# Patient Record
Sex: FEMALE | Race: WHITE | NOT HISPANIC OR LATINO | Employment: FULL TIME | URBAN - METROPOLITAN AREA
[De-identification: names, ages, dates, MRNs, and addresses within clinical notes are randomized per-mention and may not be internally consistent; named-entity substitution may affect disease eponyms.]

---

## 2019-01-23 ENCOUNTER — OFFICE VISIT (OUTPATIENT)
Dept: NEUROLOGY | Facility: CLINIC | Age: 49
End: 2019-01-23

## 2019-01-23 VITALS
SYSTOLIC BLOOD PRESSURE: 122 MMHG | WEIGHT: 177 LBS | HEART RATE: 83 BPM | BODY MASS INDEX: 27.78 KG/M2 | HEIGHT: 67 IN | DIASTOLIC BLOOD PRESSURE: 84 MMHG | RESPIRATION RATE: 20 BRPM | OXYGEN SATURATION: 98 %

## 2019-01-23 DIAGNOSIS — G43.C0 PERIODIC HEADACHE SYNDROME, NOT INTRACTABLE: Primary | ICD-10-CM

## 2019-01-23 DIAGNOSIS — G50.1 ATYPICAL FACIAL PAIN: ICD-10-CM

## 2019-01-23 PROCEDURE — 99244 OFF/OP CNSLTJ NEW/EST MOD 40: CPT | Performed by: PSYCHIATRY & NEUROLOGY

## 2019-01-23 RX ORDER — GABAPENTIN 300 MG/1
300 CAPSULE ORAL DAILY
COMMUNITY
Start: 2018-12-17 | End: 2019-02-18 | Stop reason: SDUPTHER

## 2019-01-23 RX ORDER — CITALOPRAM 40 MG/1
40 TABLET ORAL DAILY
COMMUNITY
Start: 2019-01-04 | End: 2022-08-08

## 2019-01-23 NOTE — PROGRESS NOTES
Subjective   Patient ID: Amparo Roman is a 48 y.o. female     Chief Complaint   Patient presents with   • Facial Pain        History of Present Illness    48 y.o. female referred by DR Obi Bar for atypical facial apin.  Last two years developed head tremor.  If laying head back feels desire to move head side to side and has to suppress movements.  Over the years has had motor ticks of eyes and mouth.      One week prior to [facility] right side of face abrupt onset severe pain.  Felt like a drill into her right side face.  Taken to ED and given steroid and pain medication.  Spell lasted for 30 min then stopped  Head hurt in forehead.    Then mild episodes of severe pain.  Restless with pain in head.  Sx waxed and waned over the week.      Started on GBP 3 weeks and sx have lessened.  Rare occasional burning in right face.         HA are once a week.  Severe intensity.  Assoc sx of N/V.  Vomiting improves sx.  Qualitys is throbbing.  Lasts for hours.        Reviewed medical records:    December 2018 developed throbbing and burningpain on right side of face.  Treated with steroid shots and pain medications.  Some improvement then recurred.  Rx GBP.     Past Medical History:   Diagnosis Date   • Depression    • Neuralgia      History reviewed. No pertinent family history.  Social History     Socioeconomic History   • Marital status:      Spouse name: Not on file   • Number of children: Not on file   • Years of education: Not on file   • Highest education level: Not on file   Tobacco Use   • Smoking status: Current Some Day Smoker   • Smokeless tobacco: Never Used   Substance and Sexual Activity   • Alcohol use: No     Frequency: Never   • Drug use: No       Review of Systems   Constitutional: Negative for activity change, fatigue and unexpected weight change.   HENT: Negative for tinnitus and trouble swallowing.    Eyes: Positive for visual disturbance. Negative for photophobia.   Respiratory: Negative  "for apnea, cough and choking.    Cardiovascular: Negative for leg swelling.   Gastrointestinal: Negative for nausea and vomiting.   Endocrine: Negative for cold intolerance and heat intolerance.   Genitourinary: Negative for difficulty urinating, frequency, menstrual problem and urgency.   Musculoskeletal: Negative for back pain, gait problem, myalgias and neck pain.   Skin: Negative for color change and rash.   Allergic/Immunologic: Negative for immunocompromised state.   Neurological: Positive for dizziness, numbness and headaches. Negative for tremors, seizures, syncope, facial asymmetry, speech difficulty, weakness and light-headedness.   Hematological: Negative for adenopathy. Does not bruise/bleed easily.   Psychiatric/Behavioral: Negative for behavioral problems, confusion, decreased concentration, hallucinations and sleep disturbance.       Objective     Vitals:    01/23/19 0921   BP: 122/84   Pulse: 83   Resp: 20   SpO2: 98%   Weight: 80.3 kg (177 lb)   Height: 170.2 cm (67\")       Neurologic Exam     Mental Status   Oriented to person, place, and time.   Registration: recalls 3 of 3 objects. Recall at 5 minutes: recalls 3 of 3 objects. Follows 3 step commands.   Attention: normal. Concentration: normal.   Speech: speech is normal   Level of consciousness: alert  Knowledge: good and consistent with education.   Able to name object. Able to read. Able to repeat. Able to write. Normal comprehension.     Cranial Nerves     CN II   Visual fields full to confrontation.   Visual acuity: normal  Right visual field deficit: none  Left visual field deficit: none     CN III, IV, VI   Pupils are equal, round, and reactive to light.  Extraocular motions are normal.   Right pupil: Shape: regular. Reactivity: brisk. Consensual response: intact.   Left pupil: Shape: regular. Reactivity: brisk. Consensual response: intact.   Nystagmus: none   Diplopia: none  Ophthalmoparesis: none  Upgaze: normal  Downgaze: " normal  Conjugate gaze: present  Vestibulo-ocular reflex: present    CN V   Facial sensation intact.   Right corneal reflex: normal  Left corneal reflex: normal    CN VII   Right facial weakness: none  Left facial weakness: none    CN VIII   Hearing: intact    CN IX, X   Palate: symmetric  Right gag reflex: normal  Left gag reflex: normal    CN XI   Right sternocleidomastoid strength: normal  Left sternocleidomastoid strength: normal    CN XII   Tongue: not atrophic  Fasciculations: absent  Tongue deviation: none    Motor Exam   Muscle bulk: normal  Overall muscle tone: normal  Right arm tone: normal  Left arm tone: normal  Right leg tone: normal  Left leg tone: normal    Strength   Strength 5/5 throughout.     Sensory Exam   Light touch normal.   Vibration normal.   Proprioception normal.   Pinprick normal.     Gait, Coordination, and Reflexes     Gait  Gait: normal    Coordination   Romberg: negative  Finger to nose coordination: normal  Heel to shin coordination: normal  Tandem walking coordination: normal    Tremor   Resting tremor: absent  Intention tremor: absent  Action tremor: absent    Reflexes   Reflexes 2+ except as noted.       Physical Exam   Constitutional: She is oriented to person, place, and time. Vital signs are normal. She appears well-developed and well-nourished.   HENT:   Head: Normocephalic and atraumatic.   Eyes: EOM and lids are normal. Pupils are equal, round, and reactive to light.   Fundoscopic exam:       The right eye shows no exudate, no hemorrhage and no papilledema. The right eye shows venous pulsations.        The left eye shows no exudate, no hemorrhage and no papilledema. The left eye shows venous pulsations.   Neck: Normal range of motion and phonation normal. Normal carotid pulses present. Carotid bruit is not present. No thyroid mass and no thyromegaly present.   Cardiovascular: Normal rate, regular rhythm and normal heart sounds.   Pulmonary/Chest: Effort normal.    Neurological: She is oriented to person, place, and time. She has normal strength. She has a normal Finger-Nose-Finger Test, a normal Heel to Shin Test, a normal Romberg Test and a normal Tandem Gait Test. Gait normal.   Skin: Skin is warm and dry.   Psychiatric: She has a normal mood and affect. Her speech is normal.   Nursing note and vitals reviewed.      No results found for any previous visit.         Assessment/Plan     Problem List Items Addressed This Visit        Cardiovascular and Mediastinum    Periodic headache syndrome, not intractable - Primary    Current Assessment & Plan     Headaches are newly identified.  Continue current treatment regimen.             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

## 2019-02-18 ENCOUNTER — OFFICE VISIT (OUTPATIENT)
Dept: NEUROLOGY | Facility: CLINIC | Age: 49
End: 2019-02-18

## 2019-02-18 ENCOUNTER — HOSPITAL ENCOUNTER (OUTPATIENT)
Dept: MRI IMAGING | Facility: HOSPITAL | Age: 49
Discharge: HOME | End: 2019-02-18
Attending: PSYCHIATRY & NEUROLOGY | Admitting: PSYCHIATRY & NEUROLOGY

## 2019-02-18 VITALS
SYSTOLIC BLOOD PRESSURE: 118 MMHG | WEIGHT: 175 LBS | HEIGHT: 67 IN | RESPIRATION RATE: 16 BRPM | BODY MASS INDEX: 27.47 KG/M2 | DIASTOLIC BLOOD PRESSURE: 86 MMHG | HEART RATE: 86 BPM

## 2019-02-18 DIAGNOSIS — G50.1 ATYPICAL FACIAL PAIN: ICD-10-CM

## 2019-02-18 DIAGNOSIS — G50.1 ATYPICAL FACIAL PAIN: Primary | ICD-10-CM

## 2019-02-18 DIAGNOSIS — F95.9 TIC DISORDER: ICD-10-CM

## 2019-02-18 PROCEDURE — 99214 OFFICE O/P EST MOD 30 MIN: CPT | Performed by: PSYCHIATRY & NEUROLOGY

## 2019-02-18 PROCEDURE — A9577 INJ MULTIHANCE: HCPCS | Performed by: PSYCHIATRY & NEUROLOGY

## 2019-02-18 PROCEDURE — 70553 MRI BRAIN STEM W/O & W/DYE: CPT

## 2019-02-18 PROCEDURE — 0 GADOBENATE DIMEGLUMINE 529 MG/ML SOLUTION: Performed by: PSYCHIATRY & NEUROLOGY

## 2019-02-18 RX ORDER — GABAPENTIN 300 MG/1
300 CAPSULE ORAL DAILY
Qty: 30 CAPSULE | Refills: 5 | Status: SHIPPED | OUTPATIENT
Start: 2019-02-18 | End: 2022-08-08

## 2019-02-18 RX ORDER — GUANFACINE 1 MG/1
1 TABLET ORAL NIGHTLY
Qty: 30 TABLET | Refills: 3 | Status: SHIPPED | OUTPATIENT
Start: 2019-02-18 | End: 2022-08-08

## 2019-02-18 RX ADMIN — GADOBENATE DIMEGLUMINE 15 ML: 529 INJECTION, SOLUTION INTRAVENOUS at 11:06

## 2019-02-18 NOTE — PROGRESS NOTES
Subjective   Patient ID: Amparo Roman is a 48 y.o. female     Chief Complaint   Patient presents with   • Headache        History of Present Illness    48 y.o. female returns in follow up for atypical facial pain.  Last visit on 1/23/19 ordered MRI Brain and continued GBP.    MRI Brain, my review of films, left superior cerebellar artery noted to have somewhat asymmetric appearance on the left which may have compressive findings of the adjacent trigeminal nerve as this is laterally adjacent along the margin. Agree with interpretation      Taking  mg qhs and no further episodes of right sided facial pain.       Motor tics of head movements.  Can suppress but then has rebound in sx.  Present for last two years..     Problem history:    Last two years developed head tremor.  If laying head back feels desire to move head side to side and has to suppress movements.  Over the years has had motor ticks of eyes and mouth.      One week prior to [place] right side of face abrupt onset severe pain.  Felt like a drill into her right side face.  Taken to ED and given steroid and pain medication.  Spell lasted for 30 min then stopped  Head hurt in forehead.    Then mild episodes of severe pain.  Restless with pain in head.  Sx waxed and waned over the week.      Started on GBP 3 weeks and sx have lessened.  Rare occasional burning in right face.         HA are once a week.  Severe intensity.  Assoc sx of N/V.  Vomiting improves sx.  Qualitys is throbbing.  Lasts for hours.        Reviewed medical records:    December 2018 developed throbbing and burningpain on right side of face.  Treated with steroid shots and pain medications.  Some improvement then recurred.  Rx GBP.     Past Medical History:   Diagnosis Date   • Depression    • Migraine    • Neuralgia      History reviewed. No pertinent family history.  Social History     Socioeconomic History   • Marital status:      Spouse name: Not on file   • Number of  "children: Not on file   • Years of education: Not on file   • Highest education level: Not on file   Tobacco Use   • Smoking status: Current Some Day Smoker   • Smokeless tobacco: Never Used   Substance and Sexual Activity   • Alcohol use: No     Frequency: Never   • Drug use: No   • Sexual activity: Defer       Review of Systems   Constitutional: Negative for activity change, fatigue and unexpected weight change.   HENT: Negative for trouble swallowing.    Eyes: Positive for visual disturbance.   Respiratory: Negative for apnea and choking.    Cardiovascular: Negative for leg swelling.   Endocrine: Negative for cold intolerance and heat intolerance.   Genitourinary: Negative for difficulty urinating, frequency, menstrual problem and urgency.   Musculoskeletal: Negative for gait problem and myalgias.   Skin: Negative for color change and rash.   Allergic/Immunologic: Negative for immunocompromised state.   Neurological: Negative for tremors, syncope, facial asymmetry, speech difficulty and light-headedness.   Hematological: Negative for adenopathy. Does not bruise/bleed easily.   Psychiatric/Behavioral: Negative for behavioral problems, confusion, decreased concentration, hallucinations and sleep disturbance.       Objective     Vitals:    02/18/19 1300   BP: 118/86   BP Location: Left arm   Patient Position: Sitting   Cuff Size: Adult   Pulse: 86   Resp: 16   Weight: 79.4 kg (175 lb)   Height: 170.2 cm (67\")       Neurologic Exam     Mental Status   Registration: recalls 3 of 3 objects. Recall at 5 minutes: recalls 3 of 3 objects. Follows 3 step commands.   Attention: normal. Concentration: normal.   Level of consciousness: alert  Knowledge: good and consistent with education.   Able to name object. Able to read. Able to repeat. Able to write. Normal comprehension.     Cranial Nerves     CN II   Visual fields full to confrontation.   Visual acuity: normal  Right visual field deficit: none  Left visual field " deficit: none     CN III, IV, VI   Right pupil: Shape: regular. Reactivity: brisk. Consensual response: intact.   Left pupil: Shape: regular. Reactivity: brisk. Consensual response: intact.   Nystagmus: none   Diplopia: none  Ophthalmoparesis: none  Upgaze: normal  Downgaze: normal  Conjugate gaze: present  Vestibulo-ocular reflex: present    CN V   Facial sensation intact.   Right corneal reflex: normal  Left corneal reflex: normal    CN VII   Right facial weakness: none  Left facial weakness: none    CN VIII   Hearing: intact    CN IX, X   Palate: symmetric  Right gag reflex: normal  Left gag reflex: normal    CN XI   Right sternocleidomastoid strength: normal  Left sternocleidomastoid strength: normal    CN XII   Tongue: not atrophic  Fasciculations: absent  Tongue deviation: none    Motor Exam   Muscle bulk: normal  Overall muscle tone: normal  Right arm tone: normal  Left arm tone: normal  Right leg tone: normal  Left leg tone: normal    Sensory Exam   Light touch normal.   Vibration normal.   Proprioception normal.   Pinprick normal.     Gait, Coordination, and Reflexes     Tremor   Resting tremor: absent  Intention tremor: absent  Action tremor: absent    Reflexes   Reflexes 2+ except as noted.       Physical Exam   Constitutional: She appears well-developed and well-nourished.   Nursing note and vitals reviewed.      No results found for any previous visit.         Assessment/Plan     Problem List Items Addressed This Visit        Nervous and Auditory    Atypical facial pain - Primary    Current Assessment & Plan     Sx controlled on GBP          Tic disorder    Current Assessment & Plan       Motor tic for last two years.    Start guanfacine          Relevant Medications    citalopram (CeleXA) 40 MG tablet    gabapentin (NEURONTIN) 300 MG capsule             No Follow-up on file.

## 2020-10-22 ENCOUNTER — TELEMEDICINE (OUTPATIENT)
Dept: FAMILY MEDICINE CLINIC | Facility: TELEHEALTH | Age: 50
End: 2020-10-22

## 2020-10-22 DIAGNOSIS — B37.0 THRUSH, ORAL: ICD-10-CM

## 2020-10-22 DIAGNOSIS — H92.01 RIGHT EAR PAIN: Primary | ICD-10-CM

## 2020-10-22 DIAGNOSIS — R68.2 DRY MOUTH: ICD-10-CM

## 2020-10-22 PROCEDURE — 99213 OFFICE O/P EST LOW 20 MIN: CPT | Performed by: NURSE PRACTITIONER

## 2020-10-22 RX ORDER — FLUTICASONE PROPIONATE 50 MCG
2 SPRAY, SUSPENSION (ML) NASAL DAILY
Qty: 1 BOTTLE | Refills: 0 | Status: SHIPPED | OUTPATIENT
Start: 2020-10-22 | End: 2021-08-05

## 2020-10-22 RX ORDER — LORATADINE 10 MG/1
10 TABLET ORAL DAILY
Qty: 30 TABLET | Refills: 0 | Status: SHIPPED | OUTPATIENT
Start: 2020-10-22 | End: 2021-08-05

## 2020-10-22 NOTE — PROGRESS NOTES
CHIEF COMPLAINT  Chief Complaint   Patient presents with   • Earache   • Thrush   • Dry Mouth         HPI  Amparo Roman is a 50 y.o. female  presents with complaint of extremely dry mouth, tongue is coated with thick white substance.  Also having right ear pain which is described as a dull ache that is intermittent.  She denies recent antibiotics, steroids, she is not diabetic or immunocompromised.  She does take some medication that can cause dryness of mouth, but it has not typically done this in the past.  She drinks 2-3 cans of regular soda daily, then water between the cans of soda.  She denies any cold/allergy type symptoms.    She is currently on quarantine.  She had delivered groceries to her mother's home last Wednesday, 10/14, but only walked in to her house, she was not within 6 feet of her mother.  She had a COVID-19 test on Monday 10/19 and was negative    Review of Systems   Constitutional: Negative for activity change, appetite change, fatigue and fever.   HENT: Positive for ear pain. Negative for ear discharge and trouble swallowing.         Dry mouth, white tongue     All other systems reviewed and are negative.      Past Medical History:   Diagnosis Date   • Depression    • Migraine    • Neuralgia        No family history on file.    Social History     Socioeconomic History   • Marital status:      Spouse name: Not on file   • Number of children: Not on file   • Years of education: Not on file   • Highest education level: Not on file   Tobacco Use   • Smoking status: Current Some Day Smoker   • Smokeless tobacco: Never Used   Substance and Sexual Activity   • Alcohol use: No     Frequency: Never   • Drug use: No   • Sexual activity: Defer         There were no vitals taken for this visit.    PHYSICAL EXAM  Physical Exam   Constitutional: She is oriented to person, place, and time. She appears well-developed and well-nourished. She does not have a sickly appearance. She does not appear  ill.   HENT:   Head: Normocephalic and atraumatic.   Mouth/Throat: Mucous membranes are dry and erythematous. white patches.  Pulmonary/Chest: She is in respiratory distress.  Neurological: She is alert and oriented to person, place, and time.       No results found for this or any previous visit.    Diagnoses and all orders for this visit:    1. Right ear pain (Primary)  -     loratadine (Claritin) 10 MG tablet; Take 1 tablet by mouth Daily.  Dispense: 30 tablet; Refill: 0  -     fluticasone (FLONASE) 50 MCG/ACT nasal spray; 2 sprays into the nostril(s) as directed by provider Daily.  Dispense: 1 bottle; Refill: 0  --start daily Claritin to help dry up excess fluid in inner ear  --Flonase daily to help decrease inflammation in Eustachian tube  --ibuprofen for pain  --if pain worsens or fever develops, could potentially have otitis media of right ear--will need antibiotic at that time    2. Thrush, oral  -     nystatin (MYCOSTATIN) 280057 UNIT/ML suspension; Swish and swallow 5 mL 4 (Four) Times a Day.  Dispense: 473 mL; Refill: 0  --Nystatin suspension as prescribed is an antifungal to fight oral candidiasis  --diet as tolerated depending on soreness of mouth  --if no improvement in 4-5 days--advised to see PCP or go to  for evaluation    3. Dry mouth  --increase intake of clear, decaffeinated fluids to help maintain hydration and moisten mucous membranes  --OTC mouth lubricants for dry mouth  --if no improvement, will need to see PCP for evaluation        FOLLOW-UP  As discussed during visit with PCP/[facility] if no improvement or Urgent Care/Emergency Department if worsening of symptoms    Patient verbalizes understanding of medication dosage, comfort measures, instructions for treatment and follow-up.    DANN Carrera  10/22/2020  14:35 EDT    This visit was performed via Telehealth.  This patient has been instructed to follow-up with their primary care provider if their symptoms worsen or the  treatment provided does not resolve their illness.

## 2020-10-22 NOTE — PATIENT INSTRUCTIONS
Earache, Adult  An earache, or ear pain, can be caused by many things, including:  · An infection.  · Ear wax buildup.  · Ear pressure.  · Something in the ear that should not be there (foreign body).  · A sore throat.  · Tooth problems.  · Jaw problems.  Treatment of the earache will depend on the cause. If the cause is not clear or cannot be determined, you may need to watch your symptoms until your earache goes away or until a cause is found.  Follow these instructions at home:  Medicines  · Take or apply over-the-counter and prescription medicines only as told by your health care provider.  · If you were prescribed an antibiotic medicine, use it as told by your health care provider. Do not stop using the antibiotic even if you start to feel better.  · Do not put anything in your ear other than medicine that is prescribed by your health care provider.  Managing pain  If directed, apply heat to the affected area as often as told by your health care provider. Use the heat source that your health care provider recommends, such as a moist heat pack or a heating pad.  · Place a towel between your skin and the heat source.  · Leave the heat on for 20-30 minutes.  · Remove the heat if your skin turns bright red. This is especially important if you are unable to feel pain, heat, or cold. You may have a greater risk of getting burned.  If directed, put ice on the affected area as often as told by your health care provider. To do this:         · Put ice in a plastic bag.  · Place a towel between your skin and the bag.  · Leave the ice on for 20 minutes, 2-3 times a day.  General instructions  · Pay attention to any changes in your symptoms.  · Try resting in an upright position instead of lying down. This may help to reduce pressure in your ear and relieve pain.  · Chew gum if it helps to relieve your ear pain.  · Treat any allergies as told by your health care provider.  · Drink enough fluid to keep your urine pale  yellow.  · It is up to you to get the results of any tests that were done. Ask your health care provider, or the department that is doing the tests, when your results will be ready.  · Keep all follow-up visits as told by your health care provider. This is important.  Contact a health care provider if:  · Your pain does not improve within 2 days.  · Your earache gets worse.  · You have new symptoms.  · You have a fever.  Get help right away if you:  · Have a severe headache.  · Have a stiff neck.  · Have trouble swallowing.  · Have redness or swelling behind your ear.  · Have fluid or blood coming from your ear.  · Have hearing loss.  · Feel dizzy.  Summary  · An earache, or ear pain, can be caused by many things.  · Treatment of the earache will depend on the cause. Follow recommendations from your health care provider to treat your ear pain.  · If the cause is not clear or cannot be determined, you may need to watch your symptoms until your earache goes away or until a cause is found.  · Keep all follow-up visits as told by your health care provider. This is important.  This information is not intended to replace advice given to you by your health care provider. Make sure you discuss any questions you have with your health care provider.  Document Released: 08/04/2005 Document Revised: 07/25/2020 Document Reviewed: 07/25/2020  Elsevier Patient Education © 2020 [employer]    Oral Thrush, Adult    Oral thrush, also called oral candidiasis, is a fungal infection that develops in the mouth and throat and on the tongue. It causes white patches to form on the mouth and tongue. Thrush is most common in older adults, but it can occur at any age.  Many cases of thrush are mild, but this infection can also be serious. Thrush can be a repeated (recurrent) problem for certain people who have a weak body defense system (immune system). The weakness can be caused by chronic illnesses, or by taking medicines that limit the  body's ability to fight infection. If a person has difficulty fighting infection, the fungus that causes thrush can spread through the body. This can cause life-threatening blood or organ infections.  What are the causes?  This condition is caused by a fungus (yeast) called Candida albicans.  · This fungus is normally present in small amounts in the mouth and on other mucous membranes. It usually causes no harm.  · If conditions are present that allow the fungus to grow without control, it invades surrounding tissues and becomes an infection.  · Other Candida species can also lead to thrush (rare).  What increases the risk?  This condition is more likely to develop in:  · People with a weakened immune system.  · Older adults.  · People with HIV (human immunodeficiency virus).  · People with diabetes.  · People with dry mouth (xerostomia).  · Pregnant women.  · People with poor dental care, especially people who have false teeth.  · People who use antibiotic medicines.  What are the signs or symptoms?  Symptoms of this condition can vary from mild and moderate to severe and persistent. Symptoms may include:  · A burning feeling in the mouth and throat. This can occur at the start of a thrush infection.  · White patches that stick to the mouth and tongue. The tissue around the patches may be red, raw, and painful. If rubbed (during tooth brushing, for example), the patches and the tissue of the mouth may bleed easily.  · A bad taste in the mouth or difficulty tasting foods.  · A cottony feeling in the mouth.  · Pain during eating and swallowing.  · Poor appetite.  · Cracking at the corners of the mouth.  How is this diagnosed?  This condition is diagnosed based on:  · Physical exam. Your health care provider will look in your mouth.  · Health history. Your health care provider will ask you questions about your health.  How is this treated?  This condition is treated with medicines called antifungals, which prevent the  growth of fungi. These medicines are either applied directly to the affected area (topical) or swallowed (oral). The treatment will depend on the severity of the condition.  Mild thrush  Mild cases of thrush may clear up with the use of an antifungal mouth rinse or lozenges. Treatment usually lasts about 14 days.  Moderate to severe thrush  · More severe thrush infections that have spread to the esophagus are treated with an oral antifungal medicine. A topical antifungal medicine may also be used.  · For some severe infections, treatment may need to continue for more than 14 days.  · Oral antifungal medicines are rarely used during pregnancy because they may be harmful to the unborn child. If you are pregnant, talk with your health care provider about options for treatment.  Persistent or recurrent thrush  For cases of thrush that do not go away or keep coming back:  · Treatment may be needed twice as long as the symptoms last.  · Treatment will include both oral and topical antifungal medicines.  · People with a weakened immune system can take an antifungal medicine on a continuous basis to prevent thrush infections.  It is important to treat conditions that make a person more likely to get thrush, such as diabetes or HIV.  Follow these instructions at home:  Medicines  · Take over-the-counter and prescription medicines only as told by your health care provider.  · Talk with your health care provider about an over-the-counter medicine called gentian violet, which kills bacteria and fungi.  Relieving soreness and discomfort  To help reduce the discomfort of thrush:  · Drink cold liquids such as water or iced tea.  · Try flavored ice treats or frozen juices.  · Eat foods that are easy to swallow, such as gelatin, ice cream, or custard.  · Try drinking from a straw if the patches in your mouth are painful.    General instructions  · Eat plain, unflavored yogurt as directed by your health care provider. Check the label  to make sure the yogurt contains live cultures. This yogurt can help healthy bacteria to grow in the mouth and can stop the growth of the fungus that causes thrush.  · If you wear dentures, remove the dentures before going to bed, brush them vigorously, and soak them in a cleaning solution as directed by your health care provider.  · Rinse your mouth with a warm salt-water mixture several times a day. To make a salt-water mixture, completely dissolve 1/2-1 tsp of salt in 1 cup of warm water.  Contact a health care provider if:  · Your symptoms are getting worse or are not improving within 7 days of starting treatment.  · You have symptoms of a spreading infection, such as white patches on the skin outside of the mouth.  This information is not intended to replace advice given to you by your health care provider. Make sure you discuss any questions you have with your health care provider.  Document Released: 09/12/2005 Document Revised: 03/22/2019 Document Reviewed: 09/11/2017  [employer] Patient Education © 2020 Elsevier Inc.

## 2021-06-22 RX ORDER — NORGESTIMATE AND ETHINYL ESTRADIOL 0.25-0.035
KIT ORAL
Qty: 28 TABLET | Refills: 2 | Status: SHIPPED | OUTPATIENT
Start: 2021-06-22 | End: 2022-08-08

## 2021-08-05 ENCOUNTER — OFFICE VISIT (OUTPATIENT)
Dept: OBSTETRICS AND GYNECOLOGY | Facility: CLINIC | Age: 51
End: 2021-08-05

## 2021-08-05 VITALS
DIASTOLIC BLOOD PRESSURE: 86 MMHG | BODY MASS INDEX: 28.72 KG/M2 | WEIGHT: 183 LBS | HEIGHT: 67 IN | SYSTOLIC BLOOD PRESSURE: 124 MMHG

## 2021-08-05 DIAGNOSIS — Z12.31 BREAST CANCER SCREENING BY MAMMOGRAM: ICD-10-CM

## 2021-08-05 DIAGNOSIS — N92.0 MENORRHAGIA WITH REGULAR CYCLE: ICD-10-CM

## 2021-08-05 DIAGNOSIS — Z01.419 WOMEN'S ANNUAL ROUTINE GYNECOLOGICAL EXAMINATION: Primary | ICD-10-CM

## 2021-08-05 DIAGNOSIS — R10.2 PELVIC PRESSURE IN FEMALE: ICD-10-CM

## 2021-08-05 LAB
BILIRUB BLD-MCNC: NEGATIVE MG/DL
CLARITY, POC: CLEAR
COLOR UR: YELLOW
GLUCOSE UR STRIP-MCNC: NEGATIVE MG/DL
KETONES UR QL: NEGATIVE
LEUKOCYTE EST, POC: ABNORMAL
NITRITE UR-MCNC: NEGATIVE MG/ML
PH UR: 7 [PH] (ref 5–8)
PROT UR STRIP-MCNC: NEGATIVE MG/DL
RBC # UR STRIP: ABNORMAL /UL
SP GR UR: 1.02 (ref 1–1.03)
UROBILINOGEN UR QL: NORMAL

## 2021-08-05 PROCEDURE — 81002 URINALYSIS NONAUTO W/O SCOPE: CPT | Performed by: OBSTETRICS & GYNECOLOGY

## 2021-08-05 PROCEDURE — 99396 PREV VISIT EST AGE 40-64: CPT | Performed by: OBSTETRICS & GYNECOLOGY

## 2021-08-05 PROCEDURE — 99213 OFFICE O/P EST LOW 20 MIN: CPT | Performed by: OBSTETRICS & GYNECOLOGY

## 2021-08-05 RX ORDER — PRAVASTATIN SODIUM 20 MG
20 TABLET ORAL DAILY
COMMUNITY
Start: 2021-06-30 | End: 2022-08-08

## 2021-08-05 RX ORDER — NORGESTIMATE AND ETHINYL ESTRADIOL 0.25-0.035
1 KIT ORAL DAILY
Qty: 28 TABLET | Refills: 12 | Status: CANCELLED | OUTPATIENT
Start: 2021-08-05

## 2021-08-05 RX ORDER — IBUPROFEN 800 MG/1
800 TABLET ORAL 3 TIMES DAILY
COMMUNITY
Start: 2021-06-30 | End: 2022-08-08

## 2021-08-05 NOTE — PROGRESS NOTES
GYN Annual Exam     CC - Here for annual exam.        HPI  Amparo Roman is a 51 y.o. female, , who presents for annual well woman exam.  She is premenopausal.  Periods are regular every 25-35 days, lasting 5 days. .  Dysmenorrhea:none. Patient reports problems with: anxiety/depression and night sweats and insomnia. She also reports pelvic pressure. Since her last visit she has started on pravastatin. Partner Status: Marital Status: .  New Partners since last visit: no.      Additional OB/GYN History   Current contraception: contraceptive methods: OCP (estrogen/progesterone) and Tubal ligation  Desires to: continue contraception  On HRT? No  Last Pap :   Last Completed Pap Smear          Ordered - PAP SMEAR (Every 3 Years) Ordered on 2020  Done - negative              History of abnormal Pap smear: yes - cryo in the s  Family history of uterine, colon, breast, or ovarian cancer: yes - maternal grandmother and aunt had breast cancer, also had 2 maternal aunts with endometrial cancer  Performs monthly Self-Breast Exam: yes  Last mammogram: 2017 @   Last Completed Mammogram     This patient has no relevant Health Maintenance data.        Last colonoscopy: never  Last Completed Colonoscopy     This patient has no relevant Health Maintenance data.        Last DEXA: None  Exercises Regularly: active but no regular exercise  Feelings of Anxiety or Depression: yes - anxiety, taking Celexa but states she is having more anxiety than what she normally does      Tobacco Usage?: Yes Amparo Roman  reports that she has been smoking. She has never used smokeless tobacco.. I have educated her on the risk of diseases from using tobacco products such as cancer, COPD and heart disease.     I advised her to quit and she is not willing to quit.    I spent 5 minutes counseling the patient.        OB History        2    Para   2    Term   2            AB        Living   2       SAB  "       TAB        Ectopic        Molar        Multiple        Live Births                    Health Maintenance   Topic Date Due   • Annual Gynecologic Pelvic and Breast Exam  Never done   • COLORECTAL CANCER SCREENING  Never done   • ANNUAL PHYSICAL  Never done   • Pneumococcal Vaccine 0-64 (1 of 2 - PPSV23) Never done   • COVID-19 Vaccine (1) Never done   • TDAP/TD VACCINES (1 - Tdap) Never done   • HEPATITIS C SCREENING  Never done   • ZOSTER VACCINE (1 of 2) Never done   • MAMMOGRAM  Never done   • INFLUENZA VACCINE  10/01/2021   • PAP SMEAR  07/09/2023       The additional following portions of the patient's history were reviewed and updated as appropriate: allergies, current medications, past family history, past medical history, past social history, past surgical history and problem list.    Review of Systems   Constitutional: Negative.    HENT: Negative.    Eyes: Negative.    Respiratory: Negative.    Cardiovascular: Negative.    Gastrointestinal: Negative.    Endocrine: Negative.    Genitourinary: Positive for menstrual problem and pelvic pressure.   Musculoskeletal: Negative.    Skin: Negative.    Allergic/Immunologic: Negative.    Neurological: Negative.    Hematological: Negative.    Psychiatric/Behavioral: Negative.        I have reviewed and agree with the HPI, ROS, and historical information as entered above. Magnolia Law MD    Objective   /86   Ht 170.2 cm (67\")   Wt 83 kg (183 lb)   LMP 07/05/2021   BMI 28.66 kg/m²     Physical Exam  Vitals and nursing note reviewed. Exam conducted with a chaperone present.   Constitutional:       Appearance: She is well-developed.   HENT:      Head: Normocephalic and atraumatic.   Neck:      Thyroid: No thyroid mass or thyromegaly.   Cardiovascular:      Rate and Rhythm: Normal rate and regular rhythm.      Heart sounds: No murmur heard.     Pulmonary:      Effort: Pulmonary effort is normal. No retractions.      Breath sounds: Normal breath " sounds. No wheezing, rhonchi or rales.   Chest:      Chest wall: No mass or tenderness.      Breasts:         Right: Normal. No mass, nipple discharge, skin change or tenderness.         Left: Normal. No mass, nipple discharge, skin change or tenderness.   Abdominal:      General: Bowel sounds are normal.      Palpations: Abdomen is soft. Abdomen is not rigid. There is no mass.      Tenderness: There is no abdominal tenderness. There is no guarding.      Hernia: No hernia is present. There is no hernia in the left inguinal area or right inguinal area.   Genitourinary:     General: Normal vulva.      Exam position: Lithotomy position.      Pubic Area: No rash.       Labia:         Right: No rash, tenderness or lesion.         Left: No rash, tenderness or lesion.       Urethra: No urethral pain or urethral swelling.      Vagina: Normal. No vaginal discharge or lesions.      Cervix: No cervical motion tenderness, discharge, lesion or cervical bleeding.      Uterus: Normal. Not enlarged, not fixed and not tender.       Adnexa:         Right: No mass, tenderness or fullness.          Left: No mass, tenderness or fullness.        Rectum: No external hemorrhoid.   Musculoskeletal:      Cervical back: Normal range of motion. No muscular tenderness.   Neurological:      Mental Status: She is alert and oriented to person, place, and time.   Psychiatric:         Behavior: Behavior normal.            Assessment and Plan    Problem List Items Addressed This Visit        Genitourinary and Reproductive     Menorrhagia with regular cycle    Overview     Patient had previously been treated with minimal nausea with OCPs.  Patient started smoking and was recently put on a statin.  Discussion that estrogen is contraindicated in this circumstance.  Patient unwilling to quit smoking at this time.  Will DC OCPs.  We discussed if bleeding continues and is heavy, patient to come in for an ultrasound.  We discussed other treatment options  including Lysteda and IUD.           Other Visit Diagnoses     Women's annual routine gynecological examination    -  Primary    Relevant Orders    Pap IG, HPV-hr    Pelvic pressure in female        Relevant Orders    POC Urinalysis Dipstick (Completed)    Urine Culture - Urine, Urine, Clean Catch    Breast cancer screening by mammogram        Relevant Orders    Mammo Screening Digital Tomosynthesis Bilateral With CAD          1. GYN annual well woman exam.   2. Reviewed monthly self breast exams.  Instructed to call with lumps, pain, or breast discharge.  Yearly mammograms ordered.  3. Ordered mammogram today.  4. Recommended use of Vitamin D and getting adequate calcium in her diet. (1500mg)  5. Reviewed exercise as a preventative health measures.   6. Reviewed BMI and weight loss as preventative health measures.   7. Colonoscopy recommended.  8. Smoking cessation encouraged.  Resources reviewed. (See above for full cessation details).  9. Symptoms of menopausal transition reviewed with patient.   10. Reccommended Flu Vaccine in Fall of each year.  11. RTC in 1 year or PRN with problems.    Magnolia Law MD  08/05/2021

## 2021-08-08 LAB
BACTERIA UR CULT: ABNORMAL
BACTERIA UR CULT: ABNORMAL
OTHER ANTIBIOTIC SUSC ISLT: ABNORMAL

## 2021-08-09 DIAGNOSIS — N30.00 ACUTE CYSTITIS WITHOUT HEMATURIA: Primary | ICD-10-CM

## 2021-08-09 RX ORDER — NITROFURANTOIN 25; 75 MG/1; MG/1
100 CAPSULE ORAL 2 TIMES DAILY
Qty: 10 CAPSULE | Refills: 0 | Status: SHIPPED | OUTPATIENT
Start: 2021-08-09 | End: 2021-08-14

## 2021-08-21 ENCOUNTER — APPOINTMENT (OUTPATIENT)
Dept: MAMMOGRAPHY | Facility: HOSPITAL | Age: 51
End: 2021-08-21

## 2021-08-24 DIAGNOSIS — N39.0 URINARY TRACT INFECTION WITHOUT HEMATURIA, SITE UNSPECIFIED: Primary | ICD-10-CM

## 2021-08-24 RX ORDER — SULFAMETHOXAZOLE AND TRIMETHOPRIM 800; 160 MG/1; MG/1
1 TABLET ORAL 2 TIMES DAILY
Qty: 10 TABLET | Refills: 0 | Status: SHIPPED | OUTPATIENT
Start: 2021-08-24 | End: 2021-08-29

## 2021-08-24 NOTE — TELEPHONE ENCOUNTER
Patient had a UTI and took Macrobid but is still having symptoms. She states she has COVID and cannot come in the office

## 2022-08-08 ENCOUNTER — OFFICE VISIT (OUTPATIENT)
Dept: OBSTETRICS AND GYNECOLOGY | Facility: CLINIC | Age: 52
End: 2022-08-08

## 2022-08-08 VITALS
DIASTOLIC BLOOD PRESSURE: 86 MMHG | SYSTOLIC BLOOD PRESSURE: 128 MMHG | HEIGHT: 67 IN | WEIGHT: 159 LBS | BODY MASS INDEX: 24.96 KG/M2

## 2022-08-08 DIAGNOSIS — Z12.31 BREAST CANCER SCREENING BY MAMMOGRAM: ICD-10-CM

## 2022-08-08 DIAGNOSIS — Z12.11 COLON CANCER SCREENING: ICD-10-CM

## 2022-08-08 DIAGNOSIS — Z01.419 WOMEN'S ANNUAL ROUTINE GYNECOLOGICAL EXAMINATION: Primary | ICD-10-CM

## 2022-08-08 PROBLEM — N92.0 MENORRHAGIA WITH REGULAR CYCLE: Status: RESOLVED | Noted: 2021-08-05 | Resolved: 2022-08-08

## 2022-08-08 PROCEDURE — 99396 PREV VISIT EST AGE 40-64: CPT | Performed by: OBSTETRICS & GYNECOLOGY

## 2022-08-08 RX ORDER — DESVENLAFAXINE 25 MG/1
25 TABLET, EXTENDED RELEASE ORAL DAILY
COMMUNITY
Start: 2022-07-14

## 2022-08-08 RX ORDER — ESCITALOPRAM OXALATE 10 MG/1
10 TABLET ORAL DAILY
COMMUNITY
Start: 2022-08-04

## 2022-08-08 RX ORDER — ROSUVASTATIN CALCIUM 10 MG/1
10 TABLET, COATED ORAL DAILY
COMMUNITY
Start: 2022-06-10

## 2022-08-08 NOTE — PROGRESS NOTES
"     Gynecologic Annual Exam Note        GYN Annual Exam     CC - Here for annual exam.        HPI  Amparo Roman is a 52 y.o. female, , who presents for annual well woman exam as a established patient.  She is postmenopausal. Denies vaginal bleeding.  Patient reports problems with: hot flashes, night sweats and new onset of anxiety. There were no changes to her medical or surgical history since her last visit.. Partner Status: Marital Status: .  She is is sexually active. She has not had new partners.. STD testing recommendations have been explained to the patient and she does not desire STD testing.    Additional OB/GYN History   On HRT? No    Last Pap : 21. Results: negative. HPV: negative  Last Completed Pap Smear          PAP SMEAR (Every 3 Years) Next due on 2021  SCANNED - PAP SMEAR    2020  Done - negative              History of abnormal Pap smear: yes - cryo in . States she had an abnormal with JNA but would have been when [facility] was at [facility] > 10 years ago. All normal with [facility]  Family history of uterine, colon, breast, or ovarian cancer: yes - MGM and Maternal Aunt-breast, Maternal Aunt- Uterine, PGM- \"female organs\"  Performs monthly Self-Breast Exam: yes  Last mammogram: At least 3 years ago per Pt report.     Last Completed Mammogram     This patient has no relevant Health Maintenance data.        Last colonoscopy: has never had a colonoscopy.    Last Completed Colonoscopy     This patient has no relevant Health Maintenance data.            Last bone density scan (DEXA): None  Exercises Regularly: yes  Feelings of Anxiety or Depression: yes - controlled by medications. Pt states improved since change in medication      Tobacco Usage?: No       Current Outpatient Medications:   •  Desvenlafaxine Succinate ER 25 MG tablet sustained-release 24 hour, Take 25 mg by mouth Daily., Disp: , Rfl:   •  escitalopram (LEXAPRO) 10 MG tablet, Take 10 mg by mouth " "Daily., Disp: , Rfl:   •  rosuvastatin (CRESTOR) 10 MG tablet, Take 10 mg by mouth Daily., Disp: , Rfl:     Patient denies the need for medication refills today.    OB History        2    Para   2    Term   2            AB        Living   2       SAB        IAB        Ectopic        Molar        Multiple        Live Births                    Past Medical History:   Diagnosis Date   • Abnormal Pap smear of cervix    • Anxiety    • Depression    • Migraine    • Neuralgia         Past Surgical History:   Procedure Laterality Date   • CERVIX LESION DESTRUCTION     • TUBAL ABDOMINAL LIGATION         Health Maintenance   Topic Date Due   • MAMMOGRAM  Never done   • COLORECTAL CANCER SCREENING  Never done   • COVID-19 Vaccine (1) Never done   • ANNUAL PHYSICAL  Never done   • Pneumococcal Vaccine 0-64 (1 - PCV) Never done   • TDAP/TD VACCINES (1 - Tdap) Never done   • HEPATITIS C SCREENING  Never done   • ZOSTER VACCINE (1 of 2) Never done   • Annual Gynecologic Pelvic and Breast Exam  2022   • INFLUENZA VACCINE  10/01/2022   • PAP SMEAR  2024       The additional following portions of the patient's history were reviewed and updated as appropriate: allergies, current medications, past family history, past medical history, past social history, past surgical history and problem list.    Review of Systems   Constitutional: Negative.    HENT: Negative.    Eyes: Negative.    Respiratory: Negative.    Cardiovascular: Negative.    Gastrointestinal: Negative.    Endocrine: Negative.    Genitourinary: Negative.    Musculoskeletal: Negative.    Skin: Negative.    Allergic/Immunologic: Negative.    Neurological: Negative.    Hematological: Negative.    Psychiatric/Behavioral: Positive for depressed mood. The patient is nervous/anxious.        I have reviewed and agree with the HPI, ROS, and historical information as entered above. Magnolia Law MD    Objective   /86   Ht 170.2 cm (67\")   " Wt 72.1 kg (159 lb)   LMP 07/05/2021   BMI 24.90 kg/m²     Physical Exam  Vitals and nursing note reviewed. Exam conducted with a chaperone present.   Constitutional:       Appearance: She is well-developed.   HENT:      Head: Normocephalic and atraumatic.   Neck:      Thyroid: No thyroid mass or thyromegaly.   Cardiovascular:      Rate and Rhythm: Normal rate and regular rhythm.      Heart sounds: No murmur heard.  Pulmonary:      Effort: Pulmonary effort is normal. No retractions.      Breath sounds: Normal breath sounds. No wheezing, rhonchi or rales.   Chest:      Chest wall: No mass or tenderness.   Breasts:      Right: Normal. No mass, nipple discharge, skin change or tenderness.      Left: Normal. No mass, nipple discharge, skin change or tenderness.       Abdominal:      General: Bowel sounds are normal.      Palpations: Abdomen is soft. Abdomen is not rigid. There is no mass.      Tenderness: There is no abdominal tenderness. There is no guarding.      Hernia: No hernia is present. There is no hernia in the left inguinal area or right inguinal area.   Genitourinary:     General: Normal vulva.      Exam position: Lithotomy position.      Pubic Area: No rash.       Labia:         Right: No rash, tenderness or lesion.         Left: No rash, tenderness or lesion.       Urethra: No urethral pain or urethral swelling.      Vagina: Normal. No vaginal discharge or lesions.      Cervix: No cervical motion tenderness, discharge, lesion or cervical bleeding.      Uterus: Normal. Not enlarged, not fixed and not tender.       Adnexa:         Right: No mass, tenderness or fullness.          Left: No mass, tenderness or fullness.        Rectum: No external hemorrhoid.   Musculoskeletal:      Cervical back: Normal range of motion. No muscular tenderness.   Neurological:      Mental Status: She is alert and oriented to person, place, and time.   Psychiatric:         Behavior: Behavior normal.            Assessment and  Plan    Problem List Items Addressed This Visit    None     Visit Diagnoses     Women's annual routine gynecological examination    -  Primary    Breast cancer screening by mammogram        Relevant Orders    Mammo Screening Digital Tomosynthesis Bilateral With CAD    Colon cancer screening        Relevant Orders    Ambulatory Referral For Screening Colonoscopy          1. GYN annual well woman exam.   2. Reviewed monthly self breast exams.  Instructed to call with lumps, pain, or breast discharge.  Yearly mammograms ordered.  3. Ordered mammogram today.  4. Recommended use of Vitamin D and getting adequate calcium in her diet. (1500mg)  5. Reviewed exercise as a preventative health measures.   6. Colonoscopy recommended.  7. Symptoms of menopausal transition reviewed with patient.  Reviewed risks/benefits of OTC, non-hormonal and hormonal treatment for vasomotor and other menopausal symptoms.  8. Reccommended Flu Vaccine in Fall of each year.  9. RTC in 1 year or PRN with problems.  10. No follow-ups on file.     Magnolia Law MD  08/08/2022

## 2022-09-16 DIAGNOSIS — Z12.11 ENCOUNTER FOR SCREENING COLONOSCOPY: Primary | ICD-10-CM

## 2022-10-06 ENCOUNTER — APPOINTMENT (OUTPATIENT)
Dept: MAMMOGRAPHY | Facility: HOSPITAL | Age: 52
End: 2022-10-06

## 2022-10-06 ENCOUNTER — OUTSIDE FACILITY SERVICE (OUTPATIENT)
Dept: GASTROENTEROLOGY | Facility: CLINIC | Age: 52
End: 2022-10-06

## 2022-10-06 PROCEDURE — 45385 COLONOSCOPY W/LESION REMOVAL: CPT | Performed by: INTERNAL MEDICINE

## 2022-10-06 PROCEDURE — 45380 COLONOSCOPY AND BIOPSY: CPT | Performed by: INTERNAL MEDICINE

## 2022-10-06 PROCEDURE — 88305 TISSUE EXAM BY PATHOLOGIST: CPT | Performed by: INTERNAL MEDICINE

## 2022-10-07 ENCOUNTER — LAB REQUISITION (OUTPATIENT)
Dept: LAB | Facility: HOSPITAL | Age: 52
End: 2022-10-07

## 2022-10-07 DIAGNOSIS — D12.2 BENIGN NEOPLASM OF ASCENDING COLON: ICD-10-CM

## 2022-10-07 DIAGNOSIS — D12.5 BENIGN NEOPLASM OF SIGMOID COLON: ICD-10-CM

## 2022-10-07 DIAGNOSIS — Z12.11 ENCOUNTER FOR SCREENING FOR MALIGNANT NEOPLASM OF COLON: ICD-10-CM

## 2022-10-07 DIAGNOSIS — K64.8 OTHER HEMORRHOIDS: ICD-10-CM

## 2022-10-10 LAB
CYTO UR: NORMAL
LAB AP CASE REPORT: NORMAL
LAB AP CLINICAL INFORMATION: NORMAL
PATH REPORT.FINAL DX SPEC: NORMAL
PATH REPORT.GROSS SPEC: NORMAL

## 2022-12-19 ENCOUNTER — APPOINTMENT (OUTPATIENT)
Dept: MAMMOGRAPHY | Facility: HOSPITAL | Age: 52
End: 2022-12-19

## 2023-09-11 ENCOUNTER — OFFICE VISIT (OUTPATIENT)
Dept: OBSTETRICS AND GYNECOLOGY | Facility: CLINIC | Age: 53
End: 2023-09-11
Payer: COMMERCIAL

## 2023-09-11 VITALS — SYSTOLIC BLOOD PRESSURE: 126 MMHG | DIASTOLIC BLOOD PRESSURE: 82 MMHG | WEIGHT: 153.2 LBS | BODY MASS INDEX: 23.99 KG/M2

## 2023-09-11 DIAGNOSIS — Z12.31 BREAST CANCER SCREENING BY MAMMOGRAM: ICD-10-CM

## 2023-09-11 DIAGNOSIS — Z01.419 WOMEN'S ANNUAL ROUTINE GYNECOLOGICAL EXAMINATION: Primary | ICD-10-CM

## 2023-09-11 NOTE — PROGRESS NOTES
"     Gynecologic Annual Exam Note        GYN Annual Exam     CC - Here for annual exam.        HPI  Amparo Roman is a 53 y.o. female, , who presents for annual well woman exam as a established patient.  She is postmenopausal. Denies vaginal bleeding.  Patient reports problems with: night sweats. There were no changes to her medical or surgical history since her last visit.. Partner Status: Marital Status: .  She is is sexually active. She has not had new partners.. STD testing recommendations have been explained to the patient and she does not desire STD testing.    Additional OB/GYN History   On HRT? No    Last Pap : 21. Results: negative. HPV: negative.   Last Completed Pap Smear            PAP SMEAR (Every 3 Years) Next due on 2021  SCANNED - PAP SMEAR    2020  Done - negative                  History of abnormal Pap smear: yes - cryo done in . Has had abnormal with [facility] but more than 10 years ago.   Family history of uterine, colon, breast, or ovarian cancer: yes - MGM and Maternal Aunt-breast, Maternal Aunt- Uterine, PGM- \"female organs\"   Performs monthly Self-Breast Exam: yes  Last mammogram: unsure of last.     Last Completed Mammogram       This patient has no relevant Health Maintenance data.          Last colonoscopy: has had a colonoscopy 1 year(s) ago.    Last Completed Colonoscopy            COLORECTAL CANCER SCREENING (COLONOSCOPY - Every 10 Years) Next due on 10/6/2032      10/06/2022  SCANNED - COLONOSCOPY                      Last bone density scan (DEXA): None  Exercises Regularly: yes  Feelings of Anxiety or Depression: yes - on medication      Tobacco Usage?: No       Current Outpatient Medications:     escitalopram (LEXAPRO) 10 MG tablet, Take 1 tablet by mouth Daily., Disp: , Rfl:     Patient denies the need for medication refills today.    OB History          2    Para   2    Term   2            AB        Living   2         SAB "        IAB        Ectopic        Molar        Multiple        Live Births                    Past Medical History:   Diagnosis Date    Abnormal Pap smear of cervix     Anxiety 1993    Depression     Migraine     Neuralgia         Past Surgical History:   Procedure Laterality Date    CERVIX LESION DESTRUCTION      TUBAL ABDOMINAL LIGATION         Health Maintenance   Topic Date Due    MAMMOGRAM  Never done    COVID-19 Vaccine (1) Never done    HEPATITIS C SCREENING  Never done    ANNUAL PHYSICAL  Never done    ZOSTER VACCINE (1 of 2) Never done    Annual Gynecologic Pelvic and Breast Exam  08/09/2023    INFLUENZA VACCINE  10/01/2023    PAP SMEAR  08/05/2024    COLORECTAL CANCER SCREENING  10/06/2032    TDAP/TD VACCINES (2 - Td or Tdap) 04/20/2033    Pneumococcal Vaccine 0-64  Aged Out       The additional following portions of the patient's history were reviewed and updated as appropriate: allergies, current medications, past family history, past medical history, past social history, and past surgical history.    Review of Systems   Constitutional: Negative.    HENT: Negative.     Eyes: Negative.    Respiratory: Negative.     Cardiovascular: Negative.    Gastrointestinal: Negative.    Endocrine: Negative.    Genitourinary: Negative.    Musculoskeletal: Negative.    Skin: Negative.    Allergic/Immunologic: Negative.    Neurological: Negative.    Hematological: Negative.    Psychiatric/Behavioral: Negative.       I have reviewed and agree with the HPI, ROS, and historical information as entered above. Magnolia Law MD      Objective   /82   Wt 69.5 kg (153 lb 3.2 oz)   LMP 07/05/2021   BMI 23.99 kg/m²     Physical Exam  Vitals and nursing note reviewed. Exam conducted with a chaperone present.   Constitutional:       Appearance: She is well-developed.   HENT:      Head: Normocephalic and atraumatic.   Neck:      Thyroid: No thyroid mass or thyromegaly.   Cardiovascular:      Rate and Rhythm: Normal  rate and regular rhythm.      Heart sounds: No murmur heard.  Pulmonary:      Effort: Pulmonary effort is normal. No retractions.      Breath sounds: Normal breath sounds. No wheezing, rhonchi or rales.   Chest:      Chest wall: No mass or tenderness.   Breasts:     Right: Normal. No mass, nipple discharge, skin change or tenderness.      Left: Normal. No mass, nipple discharge, skin change or tenderness.   Abdominal:      General: Bowel sounds are normal.      Palpations: Abdomen is soft. Abdomen is not rigid. There is no mass.      Tenderness: There is no abdominal tenderness. There is no guarding.      Hernia: No hernia is present. There is no hernia in the left inguinal area or right inguinal area.   Genitourinary:     General: Normal vulva.      Exam position: Lithotomy position.      Pubic Area: No rash.       Labia:         Right: No rash, tenderness or lesion.         Left: No rash, tenderness or lesion.       Urethra: No urethral pain or urethral swelling.      Vagina: Normal. No vaginal discharge or lesions.      Cervix: No cervical motion tenderness, discharge, lesion or cervical bleeding.      Uterus: Normal. Not enlarged, not fixed and not tender.       Adnexa:         Right: No mass, tenderness or fullness.          Left: No mass, tenderness or fullness.        Rectum: No external hemorrhoid.   Musculoskeletal:      Cervical back: Normal range of motion. No muscular tenderness.   Neurological:      Mental Status: She is alert and oriented to person, place, and time.   Psychiatric:         Behavior: Behavior normal.          Assessment and Plan    Problem List Items Addressed This Visit    None  Visit Diagnoses       Women's annual routine gynecological examination    -  Primary    Breast cancer screening by mammogram        Relevant Orders    Mammo Screening Digital Tomosynthesis Bilateral With CAD            GYN annual well woman exam.   Reviewed monthly self breast exams.  Instructed to call with  lumps, pain, or breast discharge.  Yearly mammograms ordered.  Ordered mammogram today.  Recommended use of Vitamin D and getting adequate calcium in her diet. (1500mg)  Osteoporosis screening ordered today.  Reviewed exercise as a preventative health measures.   Recommended Flu Vaccine in Fall of each year.  RTC in 1 year or PRN with problems.  Return in about 1 year (around 9/11/2024) for Annual physical.     Magnolia Law MD  09/11/2023

## 2023-12-19 ENCOUNTER — APPOINTMENT (OUTPATIENT)
Dept: OTHER | Facility: HOSPITAL | Age: 53
End: 2023-12-19
Payer: COMMERCIAL

## 2023-12-19 ENCOUNTER — HOSPITAL ENCOUNTER (OUTPATIENT)
Dept: MAMMOGRAPHY | Facility: HOSPITAL | Age: 53
Discharge: HOME | End: 2023-12-19
Payer: COMMERCIAL

## 2023-12-19 DIAGNOSIS — Z12.31 BREAST CANCER SCREENING BY MAMMOGRAM: ICD-10-CM

## 2023-12-19 PROCEDURE — 77067 SCR MAMMO BI INCL CAD: CPT

## 2023-12-19 PROCEDURE — 77063 BREAST TOMOSYNTHESIS BI: CPT

## 2024-02-01 ENCOUNTER — HOSPITAL ENCOUNTER (OUTPATIENT)
Dept: MAMMOGRAPHY | Facility: HOSPITAL | Age: 54
Discharge: HOME | End: 2024-02-01
Admitting: RADIOLOGY
Payer: COMMERCIAL

## 2024-02-01 DIAGNOSIS — R92.8 ABNORMAL MAMMOGRAM: ICD-10-CM

## 2024-02-01 PROCEDURE — 77065 DX MAMMO INCL CAD UNI: CPT

## 2024-02-01 PROCEDURE — G0279 TOMOSYNTHESIS, MAMMO: HCPCS

## 2024-09-19 ENCOUNTER — OFFICE VISIT (OUTPATIENT)
Dept: OBSTETRICS AND GYNECOLOGY | Facility: CLINIC | Age: 54
End: 2024-09-19
Payer: COMMERCIAL

## 2024-09-19 VITALS
SYSTOLIC BLOOD PRESSURE: 134 MMHG | WEIGHT: 156.8 LBS | BODY MASS INDEX: 24.61 KG/M2 | DIASTOLIC BLOOD PRESSURE: 90 MMHG | HEIGHT: 67 IN

## 2024-09-19 DIAGNOSIS — Z01.419 WOMEN'S ANNUAL ROUTINE GYNECOLOGICAL EXAMINATION: Primary | ICD-10-CM

## 2024-09-19 DIAGNOSIS — Z78.0 MENOPAUSE: ICD-10-CM

## 2024-09-19 DIAGNOSIS — Z12.31 BREAST CANCER SCREENING BY MAMMOGRAM: ICD-10-CM

## 2024-09-19 RX ORDER — ONDANSETRON 4 MG/1
4 TABLET, ORALLY DISINTEGRATING ORAL EVERY 8 HOURS PRN
COMMUNITY
Start: 2024-07-08

## 2024-09-19 RX ORDER — ATORVASTATIN CALCIUM 10 MG/1
10 TABLET, FILM COATED ORAL NIGHTLY
COMMUNITY
Start: 2024-09-13

## 2024-09-19 RX ORDER — DESVENLAFAXINE SUCCINATE 50 MG
50 TABLET, EXTENDED RELEASE 24 HR ORAL DAILY
COMMUNITY
Start: 2023-07-15

## 2024-09-27 LAB — REF LAB TEST METHOD: NORMAL
